# Patient Record
Sex: FEMALE | Race: WHITE
[De-identification: names, ages, dates, MRNs, and addresses within clinical notes are randomized per-mention and may not be internally consistent; named-entity substitution may affect disease eponyms.]

---

## 2018-03-06 ENCOUNTER — HOSPITAL ENCOUNTER (OUTPATIENT)
Dept: HOSPITAL 88 - OR | Age: 48
Discharge: HOME | End: 2018-03-06
Attending: UROLOGY
Payer: COMMERCIAL

## 2018-03-06 DIAGNOSIS — N20.0: Primary | ICD-10-CM

## 2018-03-06 DIAGNOSIS — Z84.1: ICD-10-CM

## 2018-03-06 DIAGNOSIS — Z87.891: ICD-10-CM

## 2018-03-06 DIAGNOSIS — K59.00: ICD-10-CM

## 2018-03-06 PROCEDURE — 81025 URINE PREGNANCY TEST: CPT

## 2018-03-06 PROCEDURE — 74018 RADEX ABDOMEN 1 VIEW: CPT

## 2018-03-06 PROCEDURE — 50590 FRAGMENTING OF KIDNEY STONE: CPT

## 2018-03-06 NOTE — DIAGNOSTIC IMAGING REPORT
PROCEDURE:X-RAY ABDOMEN - KUB

 

COMPARISON:None.

 

INDICATIONS:PRE-OP RENAL STONE

 

FINDINGS:

 

There are no dilated loops of bowel to suggest obstruction.  There are 

no masses or abnormal calcifications.  Several pelvic calcifications 

likely are phleboliths. There is no evidence of free air. No acute 

osseous abnormalities are present. Transitional S1 vertebral body.

 

 

CONCLUSION:

No acute abdominal abnormality. 

 

 

 

 

Amish Cardenas D.O.  

Dictated by:  Amish Cardenas D.O. on 3/06/2018 at 8:26     

Electronically approved by:  Amish Cardenas D.O. on 3/06/2018 at 8:26

## 2018-03-06 NOTE — OPERATIVE REPORT
DATE OF PROCEDURE:  March 06, 2018 



PREOPERATIVE DIAGNOSIS:  Left lower pole stone, symptomatic.



POSTOPERATIVE DIAGNOSIS:  Left lower pole stone, symptomatic.



PROCEDURES 

1.  Staged shock wave lithotripsy, left.

2.  Supervision of fluoroscopy.



ANESTHESIA:  General.



ESTIMATED BLOOD LOSS:  Minimal.



COMPLICATIONS:  None.



INDICATIONS FOR PROCEDURE:  Ms. Deluna is a very pleasant 47-year-old female 

with a history of a left lower pole kidney stone, which has been 

symptomatic causing intermittent colic, and now presents for definitive 

management after failure of trial of passage for over 8 weeks. She voiced 

understanding of the options, alternatives, risks, and benefits and elected 

to proceed.



PROCEDURE IN DETAIL:  After informed consent was obtained, the patient was 

supine on the operating table and placed in the supine, and prepped and 

draped in the standard fashion for lithotripsy.  The patient had a plethora 

of stool.  A stone was localized in the biplanar mode in the lower pole of 

the left kidney.  A total of 1500 shocks were delivered.  The stone was 

seen to be completed obliterated.  Max power level was only 3.  It appeared 

to be a soft stone.  The patient tolerated the procedure well and was 

transported to the recovery room with no untoward events noted.





SUPERVISION OF FLUOROSCOPY:  I was present throughout the entire procedure 

and I supervised the use of fluoroscopy as no radiologist was present.











DD:  03/06/2018 07:20

DT:  03/06/2018 07:31

Job#:  [id number]